# Patient Record
Sex: FEMALE | Race: WHITE | NOT HISPANIC OR LATINO | Employment: FULL TIME | URBAN - METROPOLITAN AREA
[De-identification: names, ages, dates, MRNs, and addresses within clinical notes are randomized per-mention and may not be internally consistent; named-entity substitution may affect disease eponyms.]

---

## 2023-10-19 PROBLEM — G43.909 MIGRAINE: Status: ACTIVE | Noted: 2023-10-19

## 2023-10-19 RX ORDER — EPINEPHRINE 0.3 MG/.3ML
0.3 INJECTION SUBCUTANEOUS EVERY 5 MIN PRN
Status: CANCELLED | OUTPATIENT
Start: 2023-10-30

## 2023-10-19 RX ORDER — ALBUTEROL SULFATE 0.83 MG/ML
3 SOLUTION RESPIRATORY (INHALATION) AS NEEDED
Status: CANCELLED | OUTPATIENT
Start: 2023-10-30

## 2023-10-19 RX ORDER — DIPHENHYDRAMINE HYDROCHLORIDE 50 MG/ML
50 INJECTION INTRAMUSCULAR; INTRAVENOUS AS NEEDED
Status: CANCELLED | OUTPATIENT
Start: 2023-10-30

## 2023-10-19 RX ORDER — FAMOTIDINE 10 MG/ML
20 INJECTION INTRAVENOUS ONCE AS NEEDED
Status: CANCELLED | OUTPATIENT
Start: 2023-10-30

## 2023-10-29 PROBLEM — M48.061 LUMBAR SPINAL STENOSIS: Status: ACTIVE | Noted: 2023-10-29

## 2023-10-29 PROBLEM — E03.9 HYPOTHYROIDISM: Status: ACTIVE | Noted: 2023-10-29

## 2023-10-29 PROBLEM — M79.7 FIBROMYALGIA: Status: ACTIVE | Noted: 2023-10-29

## 2023-10-29 PROBLEM — E88.810 DYSMETABOLIC SYNDROME X: Status: ACTIVE | Noted: 2023-10-29

## 2023-10-29 PROBLEM — M06.9 RHEUMATOID ARTHRITIS (MULTI): Status: ACTIVE | Noted: 2023-10-29

## 2023-10-29 RX ORDER — HYDROGEN PEROXIDE 3 %
SOLUTION, NON-ORAL MISCELLANEOUS
COMMUNITY
Start: 2012-02-03

## 2023-10-29 RX ORDER — TIZANIDINE 4 MG/1
TABLET ORAL
COMMUNITY
Start: 2013-01-03

## 2023-10-29 RX ORDER — POLYETHYLENE GLYCOL 3350 17 G/17G
POWDER, FOR SOLUTION ORAL
COMMUNITY
Start: 2016-04-23

## 2023-10-29 RX ORDER — LEVOCETIRIZINE DIHYDROCHLORIDE 5 MG/1
TABLET, FILM COATED ORAL
COMMUNITY
Start: 2012-11-14

## 2023-10-29 RX ORDER — ERYTHROMYCIN 500 MG/1
TABLET, DELAYED RELEASE ORAL
COMMUNITY
Start: 2013-10-13

## 2023-10-29 RX ORDER — ESOMEPRAZOLE MAGNESIUM 40 MG/1
CAPSULE, DELAYED RELEASE ORAL
COMMUNITY
Start: 2016-04-23 | End: 2024-01-30 | Stop reason: SDUPTHER

## 2023-10-29 RX ORDER — IBUPROFEN 600 MG/1
TABLET ORAL
COMMUNITY
Start: 2012-11-07

## 2023-10-29 RX ORDER — BUPROPION HYDROCHLORIDE 150 MG/1
TABLET ORAL
COMMUNITY
Start: 2012-04-24

## 2023-10-29 RX ORDER — PROMETHAZINE HYDROCHLORIDE 25 MG/1
TABLET ORAL
COMMUNITY
Start: 2012-07-30

## 2023-10-29 RX ORDER — FROVATRIPTAN SUCCINATE 2.5 MG/1
TABLET, FILM COATED ORAL
COMMUNITY
Start: 2012-07-30

## 2023-10-29 RX ORDER — BUTALBITAL, ACETAMINOPHEN, CAFFEINE AND CODEINE PHOSPHATE 300; 50; 40; 30 MG/1; MG/1; MG/1; MG/1
CAPSULE ORAL
COMMUNITY
Start: 2017-04-13

## 2023-10-29 RX ORDER — NARATRIPTAN 2.5 MG/1
TABLET ORAL
COMMUNITY
Start: 2012-12-12

## 2023-10-29 RX ORDER — HYDROCODONE BITARTRATE AND ACETAMINOPHEN 5; 325 MG/1; MG/1
TABLET ORAL
COMMUNITY
Start: 2017-03-24

## 2023-10-29 RX ORDER — HYDROXYZINE PAMOATE 50 MG/1
CAPSULE ORAL
COMMUNITY
Start: 2012-10-17

## 2023-10-29 RX ORDER — ALPRAZOLAM 0.5 MG/1
TABLET ORAL
COMMUNITY
Start: 2012-07-30

## 2023-10-29 RX ORDER — ZOLMITRIPTAN 5 MG/1
SPRAY NASAL
COMMUNITY
Start: 2016-09-30

## 2023-10-29 RX ORDER — GABAPENTIN 100 MG/1
CAPSULE ORAL
COMMUNITY
Start: 2017-08-30

## 2023-10-29 RX ORDER — ORPHENADRINE CITRATE 100 MG/1
1 TABLET, EXTENDED RELEASE ORAL 2 TIMES DAILY
COMMUNITY
Start: 2017-08-10

## 2023-10-29 RX ORDER — LEVOTHYROXINE SODIUM 125 UG/1
1 TABLET ORAL DAILY
COMMUNITY
Start: 2012-07-10

## 2023-10-29 RX ORDER — THYROID 90 MG/1
TABLET ORAL
COMMUNITY
Start: 2017-06-12

## 2023-10-29 RX ORDER — OMEPRAZOLE 40 MG/1
CAPSULE, DELAYED RELEASE ORAL
COMMUNITY
Start: 2017-07-12

## 2023-10-29 ASSESSMENT — PATIENT HEALTH QUESTIONNAIRE - PHQ9
5. POOR APPETITE OR OVEREATING: SEVERAL DAYS
2. FEELING DOWN, DEPRESSED OR HOPELESS: NOT AT ALL
3. TROUBLE FALLING OR STAYING ASLEEP OR SLEEPING TOO MUCH: NOT AT ALL
2. FEELING DOWN, DEPRESSED, IRRITABLE, OR HOPELESS: NOT AT ALL
4. FEELING TIRED OR HAVING LITTLE ENERGY: SEVERAL DAYS
3. TROUBLE FALLING OR STAYING ASLEEP OR SLEEPING TOO MUCH: 0
2. FEELING DOWN, DEPRESSED, IRRITABLE, OR HOPELESS: 0
4. FEELING TIRED OR HAVING LITTLE ENERGY: 1
10. IF YOU CHECKED OFF ANY PROBLEMS, HOW DIFFICULT HAVE THESE PROBLEMS MADE IT FOR YOU TO DO YOUR WORK, TAKE CARE OF THINGS AT HOME, OR GET ALONG WITH OTHER PEOPLE: NOT DIFFICULT AT ALL
8. MOVING OR SPEAKING SO SLOWLY THAT OTHER PEOPLE COULD HAVE NOTICED. OR THE OPPOSITE, BEING SO FIGETY OR RESTLESS THAT YOU HAVE BEEN MOVING AROUND A LOT MORE THAN USUAL: NOT AT ALL
7. TROUBLE CONCENTRATING ON THINGS, SUCH AS READING THE NEWSPAPER OR WATCHING TELEVISION: 1
1. LITTLE INTEREST OR PLEASURE IN DOING THINGS: 0
6. FEELING BAD ABOUT YOURSELF - OR THAT YOU ARE A FAILURE OR HAVE LET YOURSELF OR YOUR FAMILY DOWN: 0
6. FEELING BAD ABOUT YOURSELF - OR THAT YOU ARE A FAILURE OR HAVE LET YOURSELF OR YOUR FAMILY DOWN: NOT AT ALL
1. LITTLE INTEREST OR PLEASURE IN DOING THINGS: NOT AT ALL
4. FEELING TIRED OR HAVING LITTLE ENERGY: SEVERAL DAYS
5. POOR APPETITE OR OVEREATING: 1
6. FEELING BAD ABOUT YOURSELF - OR THAT YOU ARE A FAILURE OR HAVE LET YOURSELF OR YOUR FAMILY DOWN: NOT AT ALL
SUM OF ALL RESPONSES TO PHQ QUESTIONS 1-9: 3
10. IF YOU CHECKED OFF ANY PROBLEMS, HOW DIFFICULT HAVE THESE PROBLEMS MADE IT FOR YOU TO DO YOUR WORK, TAKE CARE OF THINGS AT HOME, OR GET ALONG WITH OTHER PEOPLE: NOT DIFFICULT AT ALL
7. TROUBLE CONCENTRATING ON THINGS, SUCH AS READING THE NEWSPAPER OR WATCHING TELEVISION: SEVERAL DAYS
3. TROUBLE FALLING OR STAYING ASLEEP OR SLEEPING TOO MUCH: NOT AT ALL
8. MOVING OR SPEAKING SO SLOWLY THAT OTHER PEOPLE COULD HAVE NOTICED. OR THE OPPOSITE, BEING SO FIGETY OR RESTLESS THAT YOU HAVE BEEN MOVING AROUND A LOT MORE THAN USUAL: NOT AT ALL
1. LITTLE INTEREST OR PLEASURE IN DOING THINGS: NOT AT ALL
7. TROUBLE CONCENTRATING ON THINGS, SUCH AS READING THE NEWSPAPER OR WATCHING TELEVISION: SEVERAL DAYS
9. THOUGHTS THAT YOU WOULD BE BETTER OFF DEAD, OR OF HURTING YOURSELF: NOT AT ALL
SUM OF ALL RESPONSES TO PHQ QUESTIONS 1-9: 3
5. POOR APPETITE OR OVEREATING: SEVERAL DAYS
9. THOUGHTS THAT YOU WOULD BE BETTER OFF DEAD, OR OF HURTING YOURSELF: NOT AT ALL
8. MOVING OR SPEAKING SO SLOWLY THAT OTHER PEOPLE COULD HAVE NOTICED. OR THE OPPOSITE, BEING SO FIGETY OR RESTLESS THAT YOU HAVE BEEN MOVING AROUND A LOT MORE THAN USUAL: 0
9. THOUGHTS THAT YOU WOULD BE BETTER OFF DEAD, OR OF HURTING YOURSELF: 0

## 2023-10-30 ENCOUNTER — INFUSION (OUTPATIENT)
Dept: INFUSION THERAPY | Facility: CLINIC | Age: 63
End: 2023-10-30
Payer: MEDICARE

## 2023-10-30 VITALS
TEMPERATURE: 97 F | RESPIRATION RATE: 16 BRPM | OXYGEN SATURATION: 98 % | HEART RATE: 77 BPM | DIASTOLIC BLOOD PRESSURE: 84 MMHG | SYSTOLIC BLOOD PRESSURE: 161 MMHG | WEIGHT: 146.61 LBS

## 2023-10-30 DIAGNOSIS — G43.901 MIGRAINE WITH STATUS MIGRAINOSUS, NOT INTRACTABLE, UNSPECIFIED MIGRAINE TYPE: ICD-10-CM

## 2023-10-30 DIAGNOSIS — G43.809 OTHER MIGRAINE WITHOUT STATUS MIGRAINOSUS, NOT INTRACTABLE: Primary | ICD-10-CM

## 2023-10-30 PROCEDURE — 96365 THER/PROPH/DIAG IV INF INIT: CPT | Performed by: NURSE PRACTITIONER

## 2023-10-30 RX ORDER — EPINEPHRINE 0.3 MG/.3ML
0.3 INJECTION SUBCUTANEOUS EVERY 5 MIN PRN
Status: CANCELLED | OUTPATIENT
Start: 2024-01-28

## 2023-10-30 RX ORDER — DICYCLOMINE HYDROCHLORIDE 10 MG/1
10 CAPSULE ORAL 4 TIMES DAILY
COMMUNITY

## 2023-10-30 RX ORDER — PREDNISONE 10 MG/1
TABLET ORAL
COMMUNITY

## 2023-10-30 RX ORDER — HYDROXYCHLOROQUINE SULFATE 200 MG/1
TABLET, FILM COATED ORAL
COMMUNITY

## 2023-10-30 RX ORDER — ALBUTEROL SULFATE 0.83 MG/ML
3 SOLUTION RESPIRATORY (INHALATION) AS NEEDED
Status: CANCELLED | OUTPATIENT
Start: 2024-01-28

## 2023-10-30 RX ORDER — DIPHENHYDRAMINE HYDROCHLORIDE 50 MG/ML
50 INJECTION INTRAMUSCULAR; INTRAVENOUS AS NEEDED
Status: CANCELLED | OUTPATIENT
Start: 2024-01-28

## 2023-10-30 RX ORDER — FAMOTIDINE 10 MG/ML
20 INJECTION INTRAVENOUS ONCE AS NEEDED
Status: CANCELLED | OUTPATIENT
Start: 2024-01-28

## 2023-10-30 ASSESSMENT — ENCOUNTER SYMPTOMS
DEPRESSION: 0
NERVOUS/ANXIOUS: 0
HEADACHES: 1
SPEECH DIFFICULTY: 0
TROUBLE SWALLOWING: 1
WOUND: 0
CHILLS: 0
WHEEZING: 0
PALPITATIONS: 0
CONFUSION: 0
EXTREMITY WEAKNESS: 0
ABDOMINAL PAIN: 1
NUMBNESS: 0
COUGH: 0
FATIGUE: 1
APPETITE CHANGE: 0
DIZZINESS: 0
LIGHT-HEADEDNESS: 0
MUSCULOSKELETAL NEGATIVE: 1
SLEEP DISTURBANCE: 1
SORE THROAT: 0
CHEST TIGHTNESS: 0
EYE PROBLEMS: 0
UNEXPECTED WEIGHT CHANGE: 1
SHORTNESS OF BREATH: 0
FEVER: 1
LEG SWELLING: 0

## 2023-10-30 NOTE — PATIENT INSTRUCTIONS
Today you received: VYEPTI 100MG INFUSION    YOUR NEXT APPOINTMENT WILL BE IN 3 MONTHS    For:   1. Other migraine without status migrainosus, not intractable    2. Migraine with status migrainosus, not intractable, unspecified migraine type          Please read the  Medication Guide that was given to you and reviewed during todays visit.     (Tell all doctors including dentists that you are taking this medication)     Go to the emergency room or call 911 if:  -You have signs of allergic reaction:   o         Rash, hives, itching.   o         Swollen, blistered, peeling skin.   o         Swelling of face, lips, mouth, tongue or throat.   o         Tightness of chest, trouble breathing, swallowing or talking      Call your doctor:     - If IV / injection site gets red, warm, swollen, itchy or leaks fluid or pus.     (Leave dressing on your IV site for at least 2 hours and keep area clean and dry  - If you get sick or have symptoms of infection or are not feeling well for any reason.    (Wash your hands often, stay away from people who are sick)  - If you have side effects from your medication that do not go away or are bothersome.     (Refer to the teaching your nurse gave you for side effects to call your doctor about)       Common side effects may include:  stuffy nose, headache, feeling tired, muscle aches, upset stomach  - Before receiving any vaccines     Call the [facility] at [phone]  if:  - You get sick, are on antibiotics, have had a recent vaccine, have surgery or dental work and your doctor wants your visit rescheduled.  - You need to cancel and reschedule your visit for any reason. Call at least 2 days before your visit if you need to cancel.   - Your insurance changes before your next visit.    (We will need to get approval from your new insurance. This can take up to two weeks.)     [facility] is opened Monday thru Friday. We are closed on weekends and  holidays.     Voice mail will take your call if the center is closed. If you leave a message please allow 24 hours for a call back during weekdays. If you leave a message on a weekend/holiday, we will call you back the next business day.

## 2023-10-30 NOTE — PROGRESS NOTES
[facility]   infusion Clinic Note   Date: 2023   Name: Adelia Garcia  : 1960   MRN: [id number]         Reason for Visit: OP Infusion (PT HERE FOR FIRST DOSE VYEPTI 100MG INFUSION.)       Visit Type: INFUSION     Ordered By: LALITO KRAUS CNP   Accompanied by:Self      Diagnosis: Other migraine without status migrainosus, not intractable    Migraine with status migrainosus, not intractable, unspecified migraine type      Allergies:   Allergies as of 10/30/2023 - Reviewed 10/30/2023   Allergen Reaction Noted    Doxycycline Constipation and Headache 10/29/2023    Penicillins Hives, Itching, and Swelling 10/29/2023        Current Medications has a current medication list which includes the following prescription(s): alprazolam, bupropion xl, butalbital-acetaminop-caf-cod, dicyclomine, eptinezumab, erythromycin, esomeprazole, esomeprazole, frovatriptan, gabapentin, hydrocodone-acetaminophen, hydroxychloroquine, hydroxyzine pamoate, ibuprofen, levocetirizine, levothyroxine, naratriptan, omeprazole, orphenadrine, polyethylene glycol, prednisone, promethazine, thyroid (pork), tizanidine, and zolmitriptan.          Vitals:  Vitals:    10/30/23 1409 10/30/23 1515   BP: 159/85 161/84   Pulse: 85 77   Resp: 16 16   Temp: 36.3 °C (97.4 °F) 36.1 °C (97 °F)   SpO2: 98% 98%   Weight: 66.5 kg (146 lb 9.7 oz)           Infusion Pre-procedure Checklist:   Allergies reviewed: yes   Medications reviewed: yes     Previous reaction to current treatment: No TODAY IS FIRST DOSE VYEPTI      Assess patient for the concerns below. Document provider notification as appropriate.  - Active or recent infection with/without current antibiotic use: no  - Recent or planned invasive dental work: no  - Recent or planned surgeries: no  - Recently received or plans to receive vaccinations: yes POSSIBLE COVID VACCINE  - Has treatment related toxicities: no  - Is pregnant:  no    - Does the patient meet  "criteria to treat? Yes    Provider notified? No      Pain: 8    Is the pain different from normal: no   Is the pain tolerable: yes   Is your Doctor aware: yes       Labs:       Fall Risk Screening: Weaver Fall Risk  History of Falling, Immediate or Within 3 Months: No  Secondary Diagnosis: No  Ambulatory Aid: Walks without aid/bedrest/nurse assist  Intravenous Therapy/Heparin Lock: Yes  Gait/Transferring: Normal/bedrest/immobile  Mental Status: Oriented to own ability  Weaver Fall Risk Score: 20       Review of Systems   Constitutional:  Positive for fatigue, fever and unexpected weight change. Negative for appetite change and chills.        PT STATES BACK IN MARCH SHE WAS EVALUATED FOR GI ISSUES AFTER LOSING APPROX 35 LBS. PT STATES SHE \"RUNS FEVERS REGULARLY.\"    HENT:   Positive for trouble swallowing. Negative for hearing loss, mouth sores, sore throat and tinnitus.         HX OF THIS. HAS BEEN EVALUATED   Eyes:  Negative for eye problems.   Respiratory:  Negative for chest tightness, cough, shortness of breath and wheezing.    Cardiovascular:  Negative for chest pain, leg swelling and palpitations.   Gastrointestinal:  Positive for abdominal pain.        HX OF THIS AND HAS MEDICAITON   Genitourinary: Negative.     Musculoskeletal: Negative.    Skin:  Negative for rash and wound.   Neurological:  Positive for headaches. Negative for dizziness, extremity weakness, light-headedness, numbness and speech difficulty.        PT HAS HX OF MIGRAINES AND HAS THEM ALMOST DAILY. MIGRAINES HAVE GOTTEN WORSE AND WILL WAKE PT UP IN THE MIDDLE OF THE NIGHT   Psychiatric/Behavioral:  Positive for sleep disturbance. Negative for confusion and depression. The patient is not nervous/anxious.         MIGRAINES WILL WAKE PT UP AT NIGHT         Infusion Readiness:   Assessment Concerns Related to Infusion: No  Provider notified: no      Document Below Only If Indicated:   New Patient Education:  PT PROVIDED WITH WRITTEN ([facility] PT " EDUCATION SHEET) AND VERBAL EDUCATION REGARDING MEDICATION GIVEN. VERIFIED MEDICATION NAME WITH PATIENT AND DISCUSSED REASON FOR USE. BRIEFLY DISCUSSED HOW MEDICATION WORKS AND EDUCATED ON GOAL OF TREATMENT, FREQUENCY OF TREATMENT, ADVERSE RXN'S AND COMMON SIDE EFFECTS TO MONITOR FOR. INSTRUCTED PT TO ASSURE THAT ALL PROVIDERS INCLUDING DENTISTS ARE AWARE OF MEDICATION RECEIVED. DISCUSSED FLOW OF VISIT AND ORIENTED TO INFUSION CENTER. PT VERBALIZES UNDERSTANDING. CALL LIGHT PROVIDED AND PT AWARE TO ALERT STAFF OF ANY CONCERNS DURING TREATMENT.         Drug Specific Questions:  Any recent cardiovascular or cerebrovascular ischemic events such as a stroke or heart attack? PT DENIES  (if yes hold and notify prescribing provider. Avoid use)       Weight Based Drug Calculations:  MEDICATION IS FLAT DOSE          Initiated By: Albertina Potter RN   Time: 3:51 PM     We administered eptinezumab (Vyepti) 100 mg in sodium chloride 0.9% 101 mL IV.

## 2024-01-30 ENCOUNTER — INFUSION (OUTPATIENT)
Dept: INFUSION THERAPY | Facility: CLINIC | Age: 64
End: 2024-01-30
Payer: MEDICARE

## 2024-01-30 VITALS
RESPIRATION RATE: 16 BRPM | DIASTOLIC BLOOD PRESSURE: 87 MMHG | SYSTOLIC BLOOD PRESSURE: 149 MMHG | WEIGHT: 154.1 LBS | HEART RATE: 89 BPM | TEMPERATURE: 97.7 F | OXYGEN SATURATION: 98 %

## 2024-01-30 DIAGNOSIS — G43.901 MIGRAINE WITH STATUS MIGRAINOSUS, NOT INTRACTABLE, UNSPECIFIED MIGRAINE TYPE: ICD-10-CM

## 2024-01-30 PROCEDURE — 96365 THER/PROPH/DIAG IV INF INIT: CPT | Performed by: NURSE PRACTITIONER

## 2024-01-30 RX ORDER — EPINEPHRINE 0.3 MG/.3ML
0.3 INJECTION SUBCUTANEOUS EVERY 5 MIN PRN
Status: CANCELLED | OUTPATIENT
Start: 2024-04-27

## 2024-01-30 RX ORDER — ETODOLAC 400 MG/1
400 TABLET, FILM COATED ORAL 2 TIMES DAILY
COMMUNITY

## 2024-01-30 RX ORDER — DIPHENHYDRAMINE HYDROCHLORIDE 50 MG/ML
50 INJECTION INTRAMUSCULAR; INTRAVENOUS AS NEEDED
Status: CANCELLED | OUTPATIENT
Start: 2024-04-27

## 2024-01-30 RX ORDER — PREGABALIN 25 MG/1
25 CAPSULE ORAL 2 TIMES DAILY
COMMUNITY

## 2024-01-30 RX ORDER — ALBUTEROL SULFATE 0.83 MG/ML
3 SOLUTION RESPIRATORY (INHALATION) AS NEEDED
Status: CANCELLED | OUTPATIENT
Start: 2024-04-27

## 2024-01-30 RX ORDER — FAMOTIDINE 10 MG/ML
20 INJECTION INTRAVENOUS ONCE AS NEEDED
Status: CANCELLED | OUTPATIENT
Start: 2024-04-27

## 2024-01-30 ASSESSMENT — ENCOUNTER SYMPTOMS
HEMATURIA: 0
ROS GI COMMENTS: MANAGED
WOUND: 0
LIGHT-HEADEDNESS: 0
TROUBLE SWALLOWING: 0
VOICE CHANGE: 0
FREQUENCY: 0
DIZZINESS: 0
ARTHRALGIAS: 1
MYALGIAS: 1
COUGH: 0
VOMITING: 0
EXTREMITY WEAKNESS: 0
SORE THROAT: 0
APPETITE CHANGE: 0
CONSTIPATION: 0
DYSURIA: 0
NUMBNESS: 0
SHORTNESS OF BREATH: 0
PALPITATIONS: 0
FATIGUE: 0
FEVER: 0
CHILLS: 0
DIARRHEA: 0
BLOOD IN STOOL: 0
NAUSEA: 0
UNEXPECTED WEIGHT CHANGE: 0
ABDOMINAL PAIN: 0
LEG SWELLING: 0
EYE PROBLEMS: 0
HEADACHES: 1
WHEEZING: 0

## 2024-01-30 NOTE — PROGRESS NOTES
[facility]   infusion Clinic Note   Date: 2024   Name: Adelia Garcia  : 1960   MRN: [id number]         Reason for Visit: Follow-up and OP Infusion (PT IS HERE FOR VYEPTI 100 MG EVERY 90 DAYS.)         Visit Type: INFUSION      Ordered By: CAMILLE KRAUS CNP      Accompanied by:Self      Diagnosis: Migraine with status migrainosus, not intractable, unspecified migraine type       Allergies:   Allergies as of 2024 - Reviewed 2024   Allergen Reaction Noted    Doxycycline Constipation and Headache 10/29/2023    Penicillins Hives, Itching, and Swelling 10/29/2023         Current Medications has a current medication list which includes the following prescription(s): bupropion xl, butalbital-acetaminop-caf-cod, dicyclomine, eptinezumab, esomeprazole, etodolac, hydroxychloroquine, levothyroxine, polyethylene glycol, prednisone, pregabalin, promethazine, tizanidine, zolmitriptan, alprazolam, erythromycin, frovatriptan, gabapentin, hydrocodone-acetaminophen, hydroxyzine pamoate, ibuprofen, levocetirizine, naratriptan, omeprazole, orphenadrine, and thyroid (pork).       Vitals:  Vitals:    24 1350 24 1458   BP: 145/82 149/87   Pulse: 96 89   Resp: 16 16   Temp: 36.2 °C (97.2 °F) 36.5 °C (97.7 °F)   TempSrc:  Temporal   SpO2: 99% 98%   Weight: 69.9 kg (154 lb 1.6 oz)              Infusion Pre-procedure Checklist:   - Allergies reviewed: yes   - Medications reviewed: yes       - Previous reaction to current treatment: no      Assess patient for the concerns below. Document provider notification as appropriate.  - Active or recent infection with/without current antibiotic use: no  - Recent or planned invasive dental work: no  - Recent or planned surgeries: no  - Recently received or plans to receive vaccinations: no  - Has treatment related toxicities: no  - Is pregnant:  no      Pain: 7   - Is the pain different from normal: no   - Is the pain tolerable: yes   -  Is your Doctor aware:  yes      Labs: N/A         Fall Risk Screening: Weaver Fall Risk  History of Falling, Immediate or Within 3 Months: No  Secondary Diagnosis: Yes  Ambulatory Aid: Walks without aid/bedrest/nurse assist  Intravenous Therapy/Heparin Lock: Yes  Gait/Transferring: Normal/bedrest/immobile  Mental Status: Oriented to own ability  Weaver Fall Risk Score: 35       Review Of Systems:  Review of Systems   Constitutional:  Negative for appetite change, chills, fatigue, fever and unexpected weight change.   HENT:   Negative for hearing loss, mouth sores, sore throat, tinnitus, trouble swallowing and voice change.    Eyes:  Negative for eye problems.   Respiratory:  Negative for cough, shortness of breath and wheezing.    Cardiovascular:  Negative for chest pain, leg swelling and palpitations.   Gastrointestinal:  Negative for abdominal pain, blood in stool, constipation, diarrhea, nausea and vomiting.        MANAGED   Genitourinary:  Negative for dysuria, frequency and hematuria.    Musculoskeletal:  Positive for arthralgias and myalgias.        MANAGED   Skin:  Negative for itching, rash and wound.   Neurological:  Positive for headaches. Negative for dizziness, extremity weakness, light-headedness and numbness.        MANAGED         Infusion Readiness:   - Assessment Concerns Related to Infusion: No  - Provider notified: n/a      Document Below Only If Indicated:   New Patient Education:    N/A (returning patient for continuation of therapy. Ongoing education provided as needed.)        Treatment Conditions & Drug Specific Questions:    Eptinezumab  (VYEPTI)    (Unless otherwise specified on patient specific therapy plan):     DRUG SPECIFIC QUESTIONS:  Any recent cardiovascular or cerebrovascular ischemic events such as a stroke or heart attack?  No  (if yes hold and notify prescribing provider. Avoid use)     REMINDER:  Recommended Vitals/Observation:  Vitals: Monitor vitals at start and end of infusion,  end of monitoring period (1st infusion), and as needed.  Observation: Patient may leave 15 minutes post-infusion for FIRST infusion. Patient may leave immediately upon completion for all subsequent infusions        Weight Based Drug Calculations:    WEIGHT BASED DRUGS: NOT APPLICABLE / FLAT DOSE          Initiated By: Suri Altman RN   Time: 3:21 PM     We administered eptinezumab (Vyepti) 100 mg in sodium chloride 0.9% 101 mL IV.

## 2024-01-30 NOTE — PATIENT INSTRUCTIONS
Today :We administered eptinezumab (Vyepti) 100 mg in sodium chloride 0.9% 101 mL IV.     For:   1. Migraine with status migrainosus, not intractable, unspecified migraine type         Your next appointment is due in:  3 MO        Please read the  Medication Guide that was given to you and reviewed during todays visit.     (Tell all doctors including dentists that you are taking this medication)     Go to the emergency room or call 911 if:  -You have signs of allergic reaction:   -Rash, hives, itching.   -Swollen, blistered, peeling skin.   -Swelling of face, lips, mouth, tongue or throat.   -Tightness of chest, trouble breathing, swallowing or talking     Call your doctor:  - If IV / injection site gets red, warm, swollen, itchy or leaks fluid or pus.     (Leave dressing on your IV site for at least 2 hours and keep area clean and dry  - If you get sick or have symptoms of infection or are not feeling well for any reason.    (Wash your hands often, stay away from people who are sick)  - If you have side effects from your medication that do not go away or are bothersome.     (Refer to the teaching your nurse gave you for side effects to call your doctor about)    - Common side effects may include:  stuffy nose, headache, feeling tired, muscle aches, upset stomach  - Before receiving any vaccines     - Call the [facility] at   If:  - You get sick, are on antibiotics, have had a recent vaccine, have surgery or dental work and your doctor wants your visit rescheduled.  - You need to cancel and reschedule your visit for any reason. Call at least 2 days before your visit if you need to cancel.   - Your insurance changes before your next visit.    (We will need to get approval from your new insurance. This can take up to two weeks.)     The [facility] is opened Monday thru Friday. We are closed on weekends and holidays.   Voice mail will take your call if the center is closed. If you  leave a message please allow 24 hours for a call back during weekdays. If you leave a message on a weekend/holiday, we will call you back the next business day.

## 2024-03-18 DIAGNOSIS — G43.901 MIGRAINE WITH STATUS MIGRAINOSUS, NOT INTRACTABLE, UNSPECIFIED MIGRAINE TYPE: Primary | ICD-10-CM

## 2024-03-20 RX ORDER — DIPHENHYDRAMINE HYDROCHLORIDE 50 MG/ML
50 INJECTION INTRAMUSCULAR; INTRAVENOUS AS NEEDED
Status: CANCELLED | OUTPATIENT
Start: 2024-04-30

## 2024-03-20 RX ORDER — EPINEPHRINE 0.3 MG/.3ML
0.3 INJECTION SUBCUTANEOUS EVERY 5 MIN PRN
Status: CANCELLED | OUTPATIENT
Start: 2024-04-30

## 2024-03-20 RX ORDER — ALBUTEROL SULFATE 0.83 MG/ML
3 SOLUTION RESPIRATORY (INHALATION) AS NEEDED
Status: CANCELLED | OUTPATIENT
Start: 2024-04-30

## 2024-03-20 RX ORDER — FAMOTIDINE 10 MG/ML
20 INJECTION INTRAVENOUS ONCE AS NEEDED
Status: CANCELLED | OUTPATIENT
Start: 2024-04-30

## 2024-04-30 ENCOUNTER — INFUSION (OUTPATIENT)
Dept: INFUSION THERAPY | Facility: CLINIC | Age: 64
End: 2024-04-30
Payer: MEDICARE

## 2024-04-30 VITALS
WEIGHT: 159.61 LBS | DIASTOLIC BLOOD PRESSURE: 85 MMHG | RESPIRATION RATE: 16 BRPM | SYSTOLIC BLOOD PRESSURE: 145 MMHG | OXYGEN SATURATION: 97 % | HEART RATE: 81 BPM | TEMPERATURE: 98.6 F

## 2024-04-30 DIAGNOSIS — G43.901 MIGRAINE WITH STATUS MIGRAINOSUS, NOT INTRACTABLE, UNSPECIFIED MIGRAINE TYPE: ICD-10-CM

## 2024-04-30 PROCEDURE — 96365 THER/PROPH/DIAG IV INF INIT: CPT | Performed by: NURSE PRACTITIONER

## 2024-04-30 RX ORDER — EPINEPHRINE 0.3 MG/.3ML
0.3 INJECTION SUBCUTANEOUS EVERY 5 MIN PRN
OUTPATIENT
Start: 2024-07-29

## 2024-04-30 RX ORDER — FAMOTIDINE 10 MG/ML
20 INJECTION INTRAVENOUS ONCE AS NEEDED
OUTPATIENT
Start: 2024-07-29

## 2024-04-30 RX ORDER — ALBUTEROL SULFATE 0.83 MG/ML
3 SOLUTION RESPIRATORY (INHALATION) AS NEEDED
OUTPATIENT
Start: 2024-07-29

## 2024-04-30 RX ORDER — DIPHENHYDRAMINE HYDROCHLORIDE 50 MG/ML
50 INJECTION INTRAMUSCULAR; INTRAVENOUS AS NEEDED
OUTPATIENT
Start: 2024-07-29

## 2024-04-30 ASSESSMENT — ENCOUNTER SYMPTOMS
HEMATURIA: 0
LIGHT-HEADEDNESS: 0
VOMITING: 0
APPETITE CHANGE: 0
SORE THROAT: 0
EXTREMITY WEAKNESS: 0
MYALGIAS: 0
DIARRHEA: 0
ABDOMINAL PAIN: 0
VOICE CHANGE: 0
SHORTNESS OF BREATH: 0
DEPRESSION: 0
FREQUENCY: 0
ARTHRALGIAS: 0
UNEXPECTED WEIGHT CHANGE: 0
BLOOD IN STOOL: 0
TROUBLE SWALLOWING: 0
WHEEZING: 0
CHILLS: 0
LEG SWELLING: 0
FEVER: 0
HEADACHES: 1
NAUSEA: 0
PALPITATIONS: 0
DYSURIA: 0
COUGH: 0
EYE PROBLEMS: 1
NUMBNESS: 0
NERVOUS/ANXIOUS: 0
WOUND: 0
DIZZINESS: 0
CONSTIPATION: 0
FATIGUE: 0

## 2024-04-30 NOTE — PATIENT INSTRUCTIONS
Today :We administered eptinezumab (Vyepti) 300 mg in sodium chloride 0.9% 103 mL IV.     For:   1. Migraine with status migrainosus, not intractable, unspecified migraine type         Your next appointment is due in:  3 MO        Please read the  Medication Guide that was given to you and reviewed during todays visit.     (Tell all doctors including dentists that you are taking this medication)     Go to the emergency room or call 911 if:  -You have signs of allergic reaction:   -Rash, hives, itching.   -Swollen, blistered, peeling skin.   -Swelling of face, lips, mouth, tongue or throat.   -Tightness of chest, trouble breathing, swallowing or talking     Call your doctor:  - If IV / injection site gets red, warm, swollen, itchy or leaks fluid or pus.     (Leave dressing on your IV site for at least 2 hours and keep area clean and dry  - If you get sick or have symptoms of infection or are not feeling well for any reason.    (Wash your hands often, stay away from people who are sick)  - If you have side effects from your medication that do not go away or are bothersome.     (Refer to the teaching your nurse gave you for side effects to call your doctor about)    - Common side effects may include:  stuffy nose, headache, feeling tired, muscle aches, upset stomach  - Before receiving any vaccines     - Call the [facility] at   If:  - You get sick, are on antibiotics, have had a recent vaccine, have surgery or dental work and your doctor wants your visit rescheduled.  - You need to cancel and reschedule your visit for any reason. Call at least 2 days before your visit if you need to cancel.   - Your insurance changes before your next visit.    (We will need to get approval from your new insurance. This can take up to two weeks.)     [facility] is opened Monday thru Friday. We are closed on weekends and holidays.   Voice mail will take your call if the center is closed. If you  leave a message please allow 24 hours for a call back during weekdays. If you leave a message on a weekend/holiday, we will call you back the next business day.

## 2024-04-30 NOTE — PROGRESS NOTES
[facility]   Infusion Clinic Note   Date: 2024   Name: Adelia Garcia  : 1960   MRN: [id number]         Reason for Visit: Follow-up and OP Infusion (PT HERE FOR VYEPTI 300 MG INFUSION EVERY 90 DAYS)         Visit Type: INFUSION       Ordered By:NAYANA KIRKLAND      Accompanied by:Self      Diagnosis: Migraine with status migrainosus, not intractable, unspecified migraine type       Allergies:   Allergies as of 2024 - Reviewed 2024   Allergen Reaction Noted    Doxycycline Constipation and Headache 10/29/2023    Imitrex [sumatriptan] Other 2024    Penicillins Hives, Itching, and Swelling 10/29/2023         Current Medications has a current medication list which includes the following prescription(s): bupropion xl, butalbital-acetaminop-caf-cod, dicyclomine, eptinezumab, esomeprazole, etodolac, hydroxychloroquine, levothyroxine, naratriptan, polyethylene glycol, prednisone, pregabalin, promethazine, tizanidine, zolmitriptan, alprazolam, erythromycin, frovatriptan, gabapentin, hydrocodone-acetaminophen, hydroxyzine pamoate, ibuprofen, levocetirizine, omeprazole, orphenadrine, and thyroid (pork).       Vitals:   Vitals:    24 1315 24 1417   BP: 148/79 145/85   Pulse: 86 81   Resp: 16 16   Temp: 36.5 °C (97.7 °F) 37 °C (98.6 °F)   TempSrc: Temporal Temporal   SpO2: 96% 97%   Weight: 72.4 kg (159 lb 9.8 oz)              Infusion Pre-procedure Checklist:   - Allergies reviewed: yes   - Medications reviewed: yes       - Previous reaction to current treatment: no      Assess patient for the concerns below. Document provider notification as appropriate.  - Active or recent infection with/without current antibiotic use: no  - Recent or planned invasive dental work: yes, MAY 9TH PLANNED TOOTH PULLED   - Recent or planned surgeries: no  - Recently received or plans to receive vaccinations: no  - Has treatment related toxicities: no  - Is pregnant:  n/a       Pain: 5, MIGRAINE    - Is the pain different from normal: no   - Is the pain tolerable: yes   - Is your Doctor aware:  yes      Labs: N/A         Fall Risk Screening: Weaver Fall Risk  History of Falling, Immediate or Within 3 Months: No  Secondary Diagnosis: No  Ambulatory Aid: Walks without aid/bedrest/nurse assist  Intravenous Therapy/Heparin Lock: Yes  Gait/Transferring: Normal/bedrest/immobile  Mental Status: Oriented to own ability  Weaver Fall Risk Score: 20       Review Of Systems:  Review of Systems   Constitutional:  Negative for appetite change, chills, fatigue, fever and unexpected weight change.   HENT:   Negative for hearing loss, mouth sores, sore throat, tinnitus, trouble swallowing and voice change.    Eyes:  Positive for eye problems.        GLASSES, DRY EYES, ROSACEA IN EYES    Respiratory:  Negative for cough, shortness of breath and wheezing.    Cardiovascular:  Negative for chest pain, leg swelling and palpitations.   Gastrointestinal:  Negative for abdominal pain, blood in stool, constipation, diarrhea, nausea and vomiting.   Genitourinary:  Negative for dysuria, frequency and hematuria.    Musculoskeletal:  Negative for arthralgias and myalgias.   Skin:  Negative for itching, rash and wound.   Neurological:  Positive for headaches. Negative for dizziness, extremity weakness, light-headedness and numbness.        MIGRAINES MANAGED WITH MEDICATION    Psychiatric/Behavioral:  Negative for depression. The patient is not nervous/anxious.          Infusion Readiness:   - Assessment Concerns Related to Infusion: No  - Provider notified: n/a      Document Below Only If Indicated:   New Patient Education:    N/A (returning patient for continuation of therapy. Ongoing education provided as needed.)        Treatment Conditions & Drug Specific Questions:    Eptinezumab  (VYEPTI)    (Unless otherwise specified on patient specific therapy plan):     DRUG SPECIFIC QUESTIONS:  Any recent cardiovascular or  cerebrovascular ischemic events such as a stroke or heart attack?  No  (if yes hold and notify prescribing provider. Avoid use)     REMINDER:  Recommended Vitals/Observation:  Vitals: Monitor vitals at start and end of infusion, end of monitoring period (1st infusion), and as needed.  Observation: Patient may leave 15 minutes post-infusion for FIRST infusion. Patient may leave immediately upon completion for all subsequent infusions        Weight Based Drug Calculations:    WEIGHT BASED DRUGS: NOT APPLICABLE / FLAT DOSE          Initiated By: Suri Altman RN   Time: 2:39 PM     We administered eptinezumab (Vyepti) 300 mg in sodium chloride 0.9% 103 mL IV.

## 2024-07-30 ENCOUNTER — APPOINTMENT (OUTPATIENT)
Dept: INFUSION THERAPY | Facility: CLINIC | Age: 64
End: 2024-07-30
Payer: MEDICARE

## 2024-07-30 VITALS
RESPIRATION RATE: 16 BRPM | DIASTOLIC BLOOD PRESSURE: 79 MMHG | HEART RATE: 69 BPM | WEIGHT: 158.62 LBS | TEMPERATURE: 97.3 F | OXYGEN SATURATION: 96 % | SYSTOLIC BLOOD PRESSURE: 125 MMHG

## 2024-07-30 DIAGNOSIS — G43.901 MIGRAINE WITH STATUS MIGRAINOSUS, NOT INTRACTABLE, UNSPECIFIED MIGRAINE TYPE: ICD-10-CM

## 2024-07-30 PROCEDURE — 96365 THER/PROPH/DIAG IV INF INIT: CPT | Performed by: NURSE PRACTITIONER

## 2024-07-30 RX ORDER — DIPHENHYDRAMINE HYDROCHLORIDE 50 MG/ML
50 INJECTION INTRAMUSCULAR; INTRAVENOUS AS NEEDED
OUTPATIENT
Start: 2024-10-27

## 2024-07-30 RX ORDER — ALBUTEROL SULFATE 0.83 MG/ML
3 SOLUTION RESPIRATORY (INHALATION) AS NEEDED
OUTPATIENT
Start: 2024-10-27

## 2024-07-30 RX ORDER — FAMOTIDINE 10 MG/ML
20 INJECTION INTRAVENOUS ONCE AS NEEDED
OUTPATIENT
Start: 2024-10-27

## 2024-07-30 RX ORDER — EPINEPHRINE 0.3 MG/.3ML
0.3 INJECTION SUBCUTANEOUS EVERY 5 MIN PRN
OUTPATIENT
Start: 2024-10-27

## 2024-07-30 ASSESSMENT — ENCOUNTER SYMPTOMS
WHEEZING: 0
VOMITING: 0
DYSURIA: 0
EYE PROBLEMS: 0
UNEXPECTED WEIGHT CHANGE: 0
CONSTIPATION: 0
PALPITATIONS: 0
WOUND: 0
HEADACHES: 0
DIARRHEA: 0
NERVOUS/ANXIOUS: 0
FEVER: 0
ARTHRALGIAS: 1
ABDOMINAL PAIN: 0
SORE THROAT: 0
NUMBNESS: 0
MYALGIAS: 1
BLOOD IN STOOL: 0
DEPRESSION: 0
FATIGUE: 0
DIZZINESS: 0
NAUSEA: 0
LIGHT-HEADEDNESS: 0
LEG SWELLING: 0
HEMATURIA: 0
COUGH: 0
CHILLS: 0
APPETITE CHANGE: 0
VOICE CHANGE: 0
SHORTNESS OF BREATH: 0
TROUBLE SWALLOWING: 0
FREQUENCY: 0
EXTREMITY WEAKNESS: 0

## 2024-07-30 NOTE — PATIENT INSTRUCTIONS
Today :We administered eptinezumab (Vyepti) 300 mg in sodium chloride 0.9% 100 mL IV.     For:   1. Migraine with status migrainosus, not intractable, unspecified migraine type         Your next appointment is due in:  90 DAYS        Please read the  Medication Guide that was given to you and reviewed during todays visit.     (Tell all doctors including dentists that you are taking this medication)     Go to the emergency room or call 911 if:  -You have signs of allergic reaction:   -Rash, hives, itching.   -Swollen, blistered, peeling skin.   -Swelling of face, lips, mouth, tongue or throat.   -Tightness of chest, trouble breathing, swallowing or talking     Call your doctor:  - If IV / injection site gets red, warm, swollen, itchy or leaks fluid or pus.     (Leave dressing on your IV site for at least 2 hours and keep area clean and dry  - If you get sick or have symptoms of infection or are not feeling well for any reason.    (Wash your hands often, stay away from people who are sick)  - If you have side effects from your medication that do not go away or are bothersome.     (Refer to the teaching your nurse gave you for side effects to call your doctor about)    - Common side effects may include:  stuffy nose, headache, feeling tired, muscle aches, upset stomach  - Before receiving any vaccines     - Call the [facility] at   If:  - You get sick, are on antibiotics, have had a recent vaccine, have surgery or dental work and your doctor wants your visit rescheduled.  - You need to cancel and reschedule your visit for any reason. Call at least 2 days before your visit if you need to cancel.   - Your insurance changes before your next visit.    (We will need to get approval from your new insurance. This can take up to two weeks.)     [facility] is opened Monday thru Friday. We are closed on weekends and holidays.   Voice mail will take your call if the center is closed. If you  leave a message please allow 24 hours for a call back during weekdays. If you leave a message on a weekend/holiday, we will call you back the next business day.

## 2024-07-30 NOTE — PROGRESS NOTES
[facility]   Infusion Clinic Note   Date: 2024   Name: Adelia Garcia  : 1960   MRN: [id number]         Reason for Visit: OP Infusion and Follow-up (PT HERE FOR VYEPTI 300 MG INFUSION/NEXT APPT: 90 DAYS)         Today: We administered eptinezumab (Vyepti) 300 mg in sodium chloride 0.9% 100 mL IV.       Visit Type: INFUSION       Ordered By: DR KRAUS      Accompanied by:Self      Diagnosis: Migraine with status migrainosus, not intractable, unspecified migraine type       Allergies:   Allergies as of 2024 - Reviewed 2024   Allergen Reaction Noted    Doxycycline Constipation and Headache 10/29/2023    Imitrex [sumatriptan] Other 2024    Penicillins Hives, Itching, and Swelling 10/29/2023         Current Medications has a current medication list which includes the following prescription(s): alprazolam, bupropion xl, butalbital-acetaminop-caf-cod, dicyclomine, eptinezumab, erythromycin, esomeprazole, etodolac, frovatriptan, gabapentin, hydrocodone-acetaminophen, hydroxychloroquine, hydroxyzine pamoate, ibuprofen, levocetirizine, levothyroxine, naratriptan, omeprazole, orphenadrine, polyethylene glycol, prednisone, pregabalin, promethazine, thyroid (pork), tizanidine, and zolmitriptan.       Vitals:   Vitals:    24 1119 24 1218   BP: 130/69 125/79   Pulse: 73 69   Resp: 18 16   Temp: 36.6 °C (97.8 °F) 36.3 °C (97.3 °F)   SpO2: 98% 96%   Weight: 72 kg (158 lb 9.9 oz)              Infusion Pre-procedure Checklist:   - Allergies reviewed: yes   - Medications reviewed: yes       - Previous reaction to current treatment: no      Assess patient for the concerns below. Document provider notification as appropriate.  - Active or recent infection with/without current antibiotic use: no  - Recent or planned invasive dental work: no  - Recent or planned surgeries: no  - Recently received or plans to receive vaccinations: no  - Has treatment related  toxicities: no  - Is pregnant:  n/a      Pain: 0   - Is the pain different from normal: n/a   - Is the pain tolerable: n/a   - Is your Doctor aware:  n/a      Labs: N/A         Fall Risk Screening: Weaver Fall Risk  History of Falling, Immediate or Within 3 Months: No  Secondary Diagnosis: No  Ambulatory Aid: Walks without aid/bedrest/nurse assist  Intravenous Therapy/Heparin Lock: Yes  Gait/Transferring: Normal/bedrest/immobile  Mental Status: Oriented to own ability  Weaver Fall Risk Score: 20       Review Of Systems:  Review of Systems   Constitutional:  Negative for appetite change, chills, fatigue, fever and unexpected weight change.   HENT:   Negative for hearing loss, mouth sores, sore throat, tinnitus, trouble swallowing and voice change.    Eyes:  Negative for eye problems.   Respiratory:  Negative for cough, shortness of breath and wheezing.    Cardiovascular:  Negative for chest pain, leg swelling and palpitations.   Gastrointestinal:  Negative for abdominal pain, blood in stool, constipation, diarrhea, nausea and vomiting.   Genitourinary:  Negative for dysuria, frequency and hematuria.    Musculoskeletal:  Positive for arthralgias and myalgias.        HX OF RHEUMATOID ARTHRITIS   Skin:  Negative for itching, rash and wound.   Neurological:  Negative for dizziness, extremity weakness, headaches, light-headedness and numbness.   Psychiatric/Behavioral:  Negative for depression. The patient is not nervous/anxious.          Infusion Readiness:   - Assessment Concerns Related to Infusion: No  - Provider notified: n/a      Document Below Only If Indicated:   New Patient Education:    N/A (returning patient for continuation of therapy. Ongoing education provided as needed.)        Treatment Conditions & Drug Specific Questions:    Eptinezumab  (VYEPTI)    (Unless otherwise specified on patient specific therapy plan):     DRUG SPECIFIC QUESTIONS:  Any recent cardiovascular or cerebrovascular ischemic events such  as a stroke or heart attack?  No  (if yes hold and notify prescribing provider. Avoid use)     REMINDER:  Recommended Vitals/Observation:  Vitals: Monitor vitals at start and end of infusion, end of monitoring period (1st infusion), and as needed.  Observation: Patient may leave 15 minutes post-infusion for FIRST infusion. Patient may leave immediately upon completion for all subsequent infusions        Weight Based Drug Calculations:    WEIGHT BASED DRUGS: NOT APPLICABLE / FLAT DOSE          Initiated By: Kaylee Guerrero RN

## 2024-10-30 ENCOUNTER — APPOINTMENT (OUTPATIENT)
Dept: INFUSION THERAPY | Facility: CLINIC | Age: 64
End: 2024-10-30
Payer: MEDICARE

## 2024-10-30 VITALS
RESPIRATION RATE: 16 BRPM | SYSTOLIC BLOOD PRESSURE: 132 MMHG | WEIGHT: 157.52 LBS | OXYGEN SATURATION: 96 % | DIASTOLIC BLOOD PRESSURE: 82 MMHG | HEART RATE: 69 BPM | TEMPERATURE: 97.5 F

## 2024-10-30 DIAGNOSIS — G43.901 MIGRAINE WITH STATUS MIGRAINOSUS, NOT INTRACTABLE, UNSPECIFIED MIGRAINE TYPE: ICD-10-CM

## 2024-10-30 PROCEDURE — 96365 THER/PROPH/DIAG IV INF INIT: CPT | Performed by: NURSE PRACTITIONER

## 2024-10-30 RX ORDER — ALBUTEROL SULFATE 0.83 MG/ML
3 SOLUTION RESPIRATORY (INHALATION) AS NEEDED
OUTPATIENT
Start: 2025-01-26

## 2024-10-30 RX ORDER — FAMOTIDINE 10 MG/ML
20 INJECTION INTRAVENOUS ONCE AS NEEDED
OUTPATIENT
Start: 2025-01-26

## 2024-10-30 RX ORDER — DIPHENHYDRAMINE HYDROCHLORIDE 50 MG/ML
50 INJECTION INTRAMUSCULAR; INTRAVENOUS AS NEEDED
OUTPATIENT
Start: 2025-01-26

## 2024-10-30 RX ORDER — EPINEPHRINE 0.3 MG/.3ML
0.3 INJECTION SUBCUTANEOUS EVERY 5 MIN PRN
OUTPATIENT
Start: 2025-01-26

## 2024-10-30 ASSESSMENT — ENCOUNTER SYMPTOMS
ARTHRALGIAS: 1
PALPITATIONS: 0
WHEEZING: 0
SORE THROAT: 0
FEVER: 0
APPETITE CHANGE: 0
ABDOMINAL PAIN: 0
DIARRHEA: 0
SHORTNESS OF BREATH: 0
BLOOD IN STOOL: 0
UNEXPECTED WEIGHT CHANGE: 0
TROUBLE SWALLOWING: 0
EYE PROBLEMS: 0
VOMITING: 0
NAUSEA: 0
CONSTIPATION: 0
MYALGIAS: 1
EXTREMITY WEAKNESS: 0
NERVOUS/ANXIOUS: 0
CHILLS: 0
NUMBNESS: 0
WOUND: 0
DEPRESSION: 0
LIGHT-HEADEDNESS: 0
DIZZINESS: 0
HEADACHES: 0
FATIGUE: 0
FREQUENCY: 0
LEG SWELLING: 0
DYSURIA: 0
HEMATURIA: 0
VOICE CHANGE: 0
COUGH: 0

## 2025-01-30 ENCOUNTER — APPOINTMENT (OUTPATIENT)
Dept: INFUSION THERAPY | Facility: CLINIC | Age: 65
End: 2025-01-30
Payer: MEDICARE

## 2025-01-30 VITALS
SYSTOLIC BLOOD PRESSURE: 127 MMHG | HEART RATE: 76 BPM | OXYGEN SATURATION: 98 % | RESPIRATION RATE: 16 BRPM | WEIGHT: 161.16 LBS | TEMPERATURE: 97 F | DIASTOLIC BLOOD PRESSURE: 75 MMHG

## 2025-01-30 DIAGNOSIS — G43.901 MIGRAINE WITH STATUS MIGRAINOSUS, NOT INTRACTABLE, UNSPECIFIED MIGRAINE TYPE: ICD-10-CM

## 2025-01-30 PROCEDURE — 96365 THER/PROPH/DIAG IV INF INIT: CPT | Performed by: NURSE PRACTITIONER

## 2025-01-30 RX ORDER — EPINEPHRINE 0.3 MG/.3ML
0.3 INJECTION SUBCUTANEOUS EVERY 5 MIN PRN
OUTPATIENT
Start: 2025-04-28

## 2025-01-30 RX ORDER — DIPHENHYDRAMINE HYDROCHLORIDE 50 MG/ML
50 INJECTION INTRAMUSCULAR; INTRAVENOUS AS NEEDED
OUTPATIENT
Start: 2025-04-28

## 2025-01-30 RX ORDER — ALBUTEROL SULFATE 0.83 MG/ML
3 SOLUTION RESPIRATORY (INHALATION) AS NEEDED
OUTPATIENT
Start: 2025-04-28

## 2025-01-30 RX ORDER — FAMOTIDINE 10 MG/ML
20 INJECTION INTRAVENOUS ONCE AS NEEDED
OUTPATIENT
Start: 2025-04-28

## 2025-01-30 ASSESSMENT — ENCOUNTER SYMPTOMS
HEMATURIA: 0
FREQUENCY: 0
HEADACHES: 1
VOMITING: 0
DEPRESSION: 0
DIZZINESS: 0
PALPITATIONS: 0
CONSTIPATION: 1
LIGHT-HEADEDNESS: 0
FEVER: 0
DIARRHEA: 1
NAUSEA: 0
CHILLS: 0
EYE PROBLEMS: 0
VOICE CHANGE: 0
TROUBLE SWALLOWING: 0
UNEXPECTED WEIGHT CHANGE: 0
ABDOMINAL PAIN: 0
SORE THROAT: 0
APPETITE CHANGE: 0
WOUND: 0
ARTHRALGIAS: 1
CONFUSION: 0
COUGH: 0
NERVOUS/ANXIOUS: 0
DYSURIA: 0
MYALGIAS: 1
FATIGUE: 0
BLOOD IN STOOL: 0
LEG SWELLING: 0
NUMBNESS: 1
SHORTNESS OF BREATH: 0
WHEEZING: 0
EXTREMITY WEAKNESS: 0

## 2025-01-30 NOTE — PATIENT INSTRUCTIONS
Today :We administered eptinezumab (Vyepti) 300 mg in sodium chloride 0.9% 103 mL IV.     For:   1. Migraine with status migrainosus, not intractable, unspecified migraine type         Your next appointment is due in:  90 DAYS         Please read the  Medication Guide that was given to you and reviewed during todays visit.     (Tell all doctors including dentists that you are taking this medication)     Go to the emergency room or call 911 if:  -You have signs of allergic reaction:   -Rash, hives, itching.   -Swollen, blistered, peeling skin.   -Swelling of face, lips, mouth, tongue or throat.   -Tightness of chest, trouble breathing, swallowing or talking     Call your doctor:  - If IV / injection site gets red, warm, swollen, itchy or leaks fluid or pus.     (Leave dressing on your IV site for at least 2 hours and keep area clean and dry  - If you get sick or have symptoms of infection or are not feeling well for any reason.    (Wash your hands often, stay away from people who are sick)  - If you have side effects from your medication that do not go away or are bothersome.     (Refer to the teaching your nurse gave you for side effects to call your doctor about)    - Common side effects may include:  stuffy nose, headache, feeling tired, muscle aches, upset stomach  - Before receiving any vaccines     - [facility] at   If:  - You get sick, are on antibiotics, have had a recent vaccine, have surgery or dental work and your doctor wants your visit rescheduled.  - You need to cancel and reschedule your visit for any reason. Call at least 2 days before your visit if you need to cancel.   - Your insurance changes before your next visit.    (We will need to get approval from your new insurance. This can take up to two weeks.)     [facility] is opened Monday thru Friday. We are closed on weekends and holidays.   Voice mail will take your call if the center is closed. If you  leave a message please allow 24 hours for a call back during weekdays. If you leave a message on a weekend/holiday, we will call you back the next business day.    A pharmacist is available Monday - Friday from 8:30AM to 3:30PM to help answer any questions you may have about your prescriptions(s). Please call pharmacy at:    [facility]: [phone]  [facility]: [phone]  [facility]: [phone]

## 2025-01-30 NOTE — PROGRESS NOTES
[facility]   Infusion Clinic Note   Date: 2025   Name: Adelia Garcia  : 1960   MRN: [id number]          Reason for Visit: OP Infusion (PT HERE FOR Q 90 DAY VYEPTI 300 MG INFUSION)         Today: We administered eptinezumab (Vyepti) 300 mg in sodium chloride 0.9% 103 mL IV.       Visit Type: INFUSION       Ordered By: LALITO KRAUS       Accompanied by: Self       Diagnosis: Migraine with status migrainosus, not intractable, unspecified migraine type        Allergies:   Allergies as of 2025 - Reviewed 2025   Allergen Reaction Noted    Doxycycline Constipation and Headache 10/29/2023    Imitrex [sumatriptan] Other 2024    Penicillins Hives, Itching, and Swelling 10/29/2023          Current Medications: has a current medication list which includes the following prescription(s): bupropion xl, butalbital-acetaminop-caf-cod, dicyclomine, eptinezumab, esomeprazole, hydrocodone-acetaminophen, hydroxychloroquine, levothyroxine, naratriptan, polyethylene glycol, pregabalin, promethazine, tizanidine, zolmitriptan, alprazolam, erythromycin, etodolac, frovatriptan, gabapentin, hydroxyzine pamoate, ibuprofen, levocetirizine, omeprazole, orphenadrine, prednisone, and thyroid (pork).       Vitals:   Vitals:    25 1105 25 1202 25 1244   BP: 119/70 139/82 127/75   Pulse: 84 68 76   Resp: 16 16 16   Temp: 36.1 °C (97 °F) 36 °C (96.8 °F) 36.1 °C (97 °F)   SpO2: 96% 95% 98%   Weight: 73.1 kg (161 lb 2.5 oz)               Infusion Pre-procedure Checklist:   - Allergies & Medications reviewed: yes       - Previous reaction to current treatment: no      Assess patient for the concerns below. Document provider notification as appropriate.  - Active or recent infection with/without current antibiotic use: no  - Recent or planned invasive dental work: no  - Recent or planned surgeries: no  - Recently received or plans to receive vaccinations: no  - Has  treatment related toxicities: no  - Any chance you may be pregnant:  n/a      Pain: 6 FIBRO MYALGIA    - Is the pain different from normal: n/a   - Is your Doctor aware:  n/a       Labs: N/A          Fall Risk Screening: Weaver Fall Risk  History of Falling, Immediate or Within 3 Months: No  Secondary Diagnosis: No  Ambulatory Aid: Walks without aid/bedrest/nurse assist  Intravenous Therapy/Heparin Lock: Yes  Gait/Transferring: Normal/bedrest/immobile  Mental Status: Oriented to own ability  Weaver Fall Risk Score: 20       Review Of Systems:  Review of Systems   Constitutional:  Negative for appetite change, chills, fatigue, fever and unexpected weight change.   HENT:   Negative for hearing loss, mouth sores, sore throat, tinnitus, trouble swallowing and voice change.    Eyes:  Negative for eye problems.   Respiratory:  Negative for cough, shortness of breath and wheezing.    Cardiovascular:  Negative for chest pain, leg swelling and palpitations.   Gastrointestinal:  Positive for constipation and diarrhea. Negative for abdominal pain, blood in stool, nausea and vomiting.        Fluctuations    Genitourinary:  Negative for dysuria, frequency and hematuria.    Musculoskeletal:  Positive for arthralgias and myalgias.        HX OF FIBROMYALGIA   Skin:  Negative for itching, rash and wound.   Neurological:  Positive for headaches and numbness. Negative for dizziness, extremity weakness and light-headedness.        HX OF MIGRAINES. ADMITS TO IMPROVEMENT TO ABOUT 3-4 MIGRAINES   HX OF CARPAL TUNNEL JOSE FRANCISCO   Psychiatric/Behavioral:  Negative for confusion and depression. The patient is not nervous/anxious.          ROS completed? Yes      Infusion Readiness:  - Assessment Concerns Related to Infusion: No  - Provider notified: n/a      Document Below Only If Indicated:   New Patient Education:    N/A (returning patient for continuation of therapy. Ongoing education provided as needed.)        Treatment Conditions & Drug  Specific Questions:    Eptinezumab  (VYEPTI)    (Unless otherwise specified on patient specific therapy plan):     DRUG SPECIFIC QUESTIONS:  Any recent cardiovascular or cerebrovascular ischemic events such as a stroke or heart attack?  No  (if yes hold and notify prescribing provider. Avoid use)     REMINDER:  Recommended Vitals/Observation:  Vitals: Monitor vitals at start and end of infusion, end of monitoring period (1st infusion), and as needed.  Observation: Patient may leave 15 minutes post-infusion for FIRST infusion. Patient may leave immediately upon completion for all subsequent infusions        Weight Based Drug Calculations:    WEIGHT BASED DRUGS: NOT APPLICABLE / FLAT DOSE          Initiated By: Deloris Oro RN

## 2025-04-01 ENCOUNTER — TRANSCRIBE ORDERS (OUTPATIENT)
Dept: INFUSION THERAPY | Facility: CLINIC | Age: 65
End: 2025-04-01
Payer: MEDICARE

## 2025-04-01 DIAGNOSIS — G43.901 MIGRAINE WITH STATUS MIGRAINOSUS, NOT INTRACTABLE, UNSPECIFIED MIGRAINE TYPE: Primary | ICD-10-CM

## 2025-04-01 RX ORDER — DIPHENHYDRAMINE HYDROCHLORIDE 50 MG/ML
50 INJECTION, SOLUTION INTRAMUSCULAR; INTRAVENOUS AS NEEDED
OUTPATIENT
Start: 2025-04-30

## 2025-04-01 RX ORDER — FAMOTIDINE 10 MG/ML
20 INJECTION, SOLUTION INTRAVENOUS ONCE AS NEEDED
OUTPATIENT
Start: 2025-04-30

## 2025-04-01 RX ORDER — EPINEPHRINE 0.3 MG/.3ML
0.3 INJECTION SUBCUTANEOUS EVERY 5 MIN PRN
OUTPATIENT
Start: 2025-04-30

## 2025-04-01 RX ORDER — ALBUTEROL SULFATE 0.83 MG/ML
3 SOLUTION RESPIRATORY (INHALATION) AS NEEDED
OUTPATIENT
Start: 2025-04-30

## 2025-04-30 ENCOUNTER — APPOINTMENT (OUTPATIENT)
Dept: INFUSION THERAPY | Facility: CLINIC | Age: 65
End: 2025-04-30
Payer: MEDICARE

## 2025-04-30 VITALS
SYSTOLIC BLOOD PRESSURE: 142 MMHG | HEART RATE: 65 BPM | OXYGEN SATURATION: 97 % | TEMPERATURE: 97.8 F | DIASTOLIC BLOOD PRESSURE: 76 MMHG | RESPIRATION RATE: 16 BRPM

## 2025-04-30 DIAGNOSIS — G43.901 MIGRAINE WITH STATUS MIGRAINOSUS, NOT INTRACTABLE, UNSPECIFIED MIGRAINE TYPE: ICD-10-CM

## 2025-04-30 PROCEDURE — 96365 THER/PROPH/DIAG IV INF INIT: CPT | Performed by: NURSE PRACTITIONER

## 2025-04-30 RX ORDER — DIPHENHYDRAMINE HYDROCHLORIDE 50 MG/ML
50 INJECTION, SOLUTION INTRAMUSCULAR; INTRAVENOUS AS NEEDED
OUTPATIENT
Start: 2025-07-29

## 2025-04-30 RX ORDER — FAMOTIDINE 10 MG/ML
20 INJECTION, SOLUTION INTRAVENOUS ONCE AS NEEDED
OUTPATIENT
Start: 2025-07-29

## 2025-04-30 RX ORDER — ALBUTEROL SULFATE 0.83 MG/ML
3 SOLUTION RESPIRATORY (INHALATION) AS NEEDED
OUTPATIENT
Start: 2025-07-29

## 2025-04-30 RX ORDER — EPINEPHRINE 0.3 MG/.3ML
0.3 INJECTION SUBCUTANEOUS EVERY 5 MIN PRN
OUTPATIENT
Start: 2025-07-29

## 2025-04-30 ASSESSMENT — ENCOUNTER SYMPTOMS
VOMITING: 0
NAUSEA: 0
NERVOUS/ANXIOUS: 0
DIARRHEA: 0
FREQUENCY: 0
UNEXPECTED WEIGHT CHANGE: 0
MYALGIAS: 0
SORE THROAT: 0
TROUBLE SWALLOWING: 0
SHORTNESS OF BREATH: 0
DIZZINESS: 0
HEADACHES: 0
LEG SWELLING: 0
ARTHRALGIAS: 0
NUMBNESS: 0
CONSTIPATION: 0
VOICE CHANGE: 0
WOUND: 0
BRUISES/BLEEDS EASILY: 0
BLOOD IN STOOL: 0
PALPITATIONS: 0
FATIGUE: 0
COUGH: 0
APPETITE CHANGE: 0
EXTREMITY WEAKNESS: 0
EYE PROBLEMS: 0
DEPRESSION: 0
FEVER: 0
DYSURIA: 0
ABDOMINAL PAIN: 0
HEMATURIA: 0
CHILLS: 0
LIGHT-HEADEDNESS: 0
WHEEZING: 0

## 2025-04-30 NOTE — PROGRESS NOTES
[facility]   Infusion Clinic Note   Date: 2025   Name: Adelia Garcia  : 1960   MRN: [id number]         Reason for Visit: Follow-up and OP Infusion (PT HERE FOR VYEPTI 300MG/NEXT APT: 3 MONTHS )         Today: We administered eptinezumab (Vyepti) 300 mg in sodium chloride 0.9% 103 mL IV.       Ordered By: Priya Parish APRN*       For a Diagnosis of: Migraine with status migrainosus, not intractable, unspecified migraine type       At today's visit patient accompanied by: Self      Today's Vitals:   Vitals:    25 1130 25 1200   BP: 138/81 142/76   Pulse: 75 65   Resp: 16 16   Temp: 36.6 °C (97.8 °F) 36.6 °C (97.8 °F)   SpO2: 98% 97%             Pre - Treatment Checklist:      - Previous reaction to current treatment: no      (Assess patient for the concerns below. Document provider notification as appropriate).  - Active or recent infection with/without current antibiotic use: no  - Recent or planned invasive dental work: no  - Recent or planned surgeries: no  - Recently received or plans to receive vaccinations: no  - Has treatment related toxicities: no  - Any chance may be pregnant:  n/a      Pain: 0   - Is the pain different from normal: n/a   - Is prescribing Doctor aware:  n/a      Labs: Reviewed       Fall Risk Screening: Meg Fall Risk  History of Falling, Immediate or Within 3 Months: No  Secondary Diagnosis: No  Ambulatory Aid: Walks without aid/bedrest/nurse assist  Intravenous Therapy/Heparin Lock: No  Gait/Transferring: Normal/bedrest/immobile  Mental Status: Oriented to own ability  Weaver Fall Risk Score: 0       Review Of Systems:  Review of Systems   Constitutional:  Negative for appetite change, chills, fatigue, fever and unexpected weight change.   HENT:   Negative for hearing loss, mouth sores, sore throat, tinnitus, trouble swallowing and voice change.    Eyes:  Negative for eye problems.   Respiratory:  Negative for cough, shortness  of breath and wheezing.    Cardiovascular:  Negative for chest pain, leg swelling and palpitations.   Gastrointestinal:  Negative for abdominal pain, blood in stool, constipation, diarrhea, nausea and vomiting.   Genitourinary:  Negative for dysuria, frequency and hematuria.    Musculoskeletal:  Negative for arthralgias and myalgias.   Skin:  Negative for itching, rash and wound.   Neurological:  Negative for dizziness, extremity weakness, headaches, light-headedness and numbness.   Hematological:  Does not bruise/bleed easily.   Psychiatric/Behavioral:  Negative for depression. The patient is not nervous/anxious.          Infusion Readiness:  - Assessment Concerns Related to Infusion: No  - Provider notified: n/a      New Patient Education:    N/A (returning patient for continuation of therapy. Ongoing education provided as needed.)        Treatment Conditions & Drug Specific Questions:    Eptinezumab  (VYEPTI)    (Unless otherwise specified on patient specific therapy plan):     DRUG SPECIFIC QUESTIONS:  Any recent cardiovascular or cerebrovascular ischemic events such as a stroke or heart attack?  No  (if yes hold and notify prescribing provider. Avoid use)     REMINDER:  Recommended Vitals/Observation:  Vitals: Monitor vitals at start and end of infusion, end of monitoring period (1st infusion), and as needed.  Observation: Patient may leave 15 minutes post-infusion for FIRST infusion. Patient may leave immediately upon completion for all subsequent infusions        Weight Based Drug Calculations:    WEIGHT BASED DRUGS: NOT APPLICABLE / FLAT DOSE       Post Treatment: Patient tolerated treatment without issue and was discharged in no apparent distress.      Note Authored / Patient Cared for By: Will Davis RN

## 2025-04-30 NOTE — PATIENT INSTRUCTIONS
Today :We administered eptinezumab (Vyepti) 300 mg in sodium chloride 0.9% 103 mL IV.     For:   1. Migraine with status migrainosus, not intractable, unspecified migraine type         Your next appointment is due in:  3 MONTHS         Please read the  Medication Guide that was given to you and reviewed during todays visit.     (Tell all doctors including dentists that you are taking this medication)     Go to the emergency room or call 911 if:  -You have signs of allergic reaction:   -Rash, hives, itching.   -Swollen, blistered, peeling skin.   -Swelling of face, lips, mouth, tongue or throat.   -Tightness of chest, trouble breathing, swallowing or talking     Call your doctor:  - If IV / injection site gets red, warm, swollen, itchy or leaks fluid or pus.     (Leave dressing on your IV site for at least 2 hours and keep area clean and dry  - If you get sick or have symptoms of infection or are not feeling well for any reason.    (Wash your hands often, stay away from people who are sick)  - If you have side effects from your medication that do not go away or are bothersome.     (Refer to the teaching your nurse gave you for side effects to call your doctor about)    - Common side effects may include:  stuffy nose, headache, feeling tired, muscle aches, upset stomach  - Before receiving any vaccines     - [facility] at   If:  - You get sick, are on antibiotics, have had a recent vaccine, have surgery or dental work and your doctor wants your visit rescheduled.  - You need to cancel and reschedule your visit for any reason. Call at least 2 days before your visit if you need to cancel.   - Your insurance changes before your next visit.    (We will need to get approval from your new insurance. This can take up to two weeks.)     [facility] is opened Monday thru Friday. We are closed on weekends and holidays.   Voice mail will take your call if the center is closed. If you  leave a message please allow 24 hours for a call back during weekdays. If you leave a message on a weekend/holiday, we will call you back the next business day.    A pharmacist is available Monday - Friday from 8:30AM to 3:30PM to help answer any questions you may have about your prescriptions(s). Please call pharmacy at:    [facility]: [phone]  [facility]: [phone]  [facility]: [phone]

## 2025-07-29 ENCOUNTER — APPOINTMENT (OUTPATIENT)
Dept: INFUSION THERAPY | Facility: CLINIC | Age: 65
End: 2025-07-29
Payer: MEDICARE

## 2025-07-31 ENCOUNTER — APPOINTMENT (OUTPATIENT)
Dept: INFUSION THERAPY | Facility: CLINIC | Age: 65
End: 2025-07-31
Payer: MEDICARE

## 2025-07-31 VITALS
DIASTOLIC BLOOD PRESSURE: 74 MMHG | SYSTOLIC BLOOD PRESSURE: 117 MMHG | HEART RATE: 74 BPM | TEMPERATURE: 97 F | RESPIRATION RATE: 16 BRPM | OXYGEN SATURATION: 96 % | WEIGHT: 169.2 LBS

## 2025-07-31 DIAGNOSIS — G43.901 MIGRAINE WITH STATUS MIGRAINOSUS, NOT INTRACTABLE, UNSPECIFIED MIGRAINE TYPE: Primary | ICD-10-CM

## 2025-07-31 PROCEDURE — 96365 THER/PROPH/DIAG IV INF INIT: CPT | Performed by: NURSE PRACTITIONER

## 2025-07-31 RX ORDER — ALBUTEROL SULFATE 0.83 MG/ML
3 SOLUTION RESPIRATORY (INHALATION) AS NEEDED
OUTPATIENT
Start: 2025-10-27

## 2025-07-31 RX ORDER — FAMOTIDINE 10 MG/ML
20 INJECTION, SOLUTION INTRAVENOUS ONCE AS NEEDED
OUTPATIENT
Start: 2025-10-27

## 2025-07-31 RX ORDER — EPINEPHRINE 0.3 MG/.3ML
0.3 INJECTION SUBCUTANEOUS EVERY 5 MIN PRN
OUTPATIENT
Start: 2025-10-27

## 2025-07-31 RX ORDER — DIPHENHYDRAMINE HYDROCHLORIDE 50 MG/ML
50 INJECTION, SOLUTION INTRAMUSCULAR; INTRAVENOUS AS NEEDED
OUTPATIENT
Start: 2025-10-27

## 2025-07-31 ASSESSMENT — ENCOUNTER SYMPTOMS
WOUND: 0
ABDOMINAL DISTENTION: 0
EXTREMITY WEAKNESS: 0
FATIGUE: 0
FEVER: 0
DIARRHEA: 1
NUMBNESS: 0
FREQUENCY: 0
WHEEZING: 0
LEG SWELLING: 0
CONSTIPATION: 1
DYSURIA: 0
ABDOMINAL PAIN: 0
HEADACHES: 1
EYE PROBLEMS: 0
BRUISES/BLEEDS EASILY: 0
DIZZINESS: 0
VOICE CHANGE: 0
NAUSEA: 1
MYALGIAS: 0
SORE THROAT: 0
CHILLS: 0
APPETITE CHANGE: 0
HEMATURIA: 0
LIGHT-HEADEDNESS: 0
VOMITING: 1
SHORTNESS OF BREATH: 0
PALPITATIONS: 0
COUGH: 0
TROUBLE SWALLOWING: 0
UNEXPECTED WEIGHT CHANGE: 0
ARTHRALGIAS: 0

## 2025-07-31 NOTE — PROGRESS NOTES
[facility]   Infusion Clinic Note   Date: 2025   Name: Adelia Garcia  : 1960   MRN: [id number]         Reason for Visit: OP Infusion (VYEPTI 300 MG IV EVERY 3 MONTHS ) and Follow-up         Today: We administered eptinezumab (Vyepti) 300 mg in sodium chloride 0.9% 103 mL IV.       Referring Provider: yesika lynn CNP    Plan Provider:  Yesika Condon CNP      For a Diagnosis of: Migraine with status migrainosus, not intractable, unspecified migraine type       At today's visit patient accompanied by: Self      Today's Vitals:   Vitals:    25 1100 25 1158   BP: 122/79 117/74   Pulse: 80 74   Resp: 16 16   Temp: 36 °C (96.8 °F) 36.1 °C (97 °F)   TempSrc: Temporal Temporal   SpO2: 98% 96%   Weight: 76.8 kg (169 lb 3.3 oz)              Pre - Treatment Checklist:      - Previous reaction to current treatment: no      (Assess patient for the concerns below. Document provider notification as appropriate).  - Active or recent infection with/without current antibiotic use: no  - Recent or planned invasive dental work: YES SCHEDULED FOR DENTAL IMPLANT IN 2025  - Recent or planned surgeries: YES SCHEDULED FOR SURGERY AUG 18 2025- [facility] JOINT OF TOE   - Recently received or plans to receive vaccinations: no  - Has treatment related toxicities: no  - Any chance may be pregnant:  n/a      Pain: 0   - Is the pain different from normal: n/a   - Is prescribing Doctor aware:  n/a      Labs: N/A      Fall Risk Screening: Weaver Fall Risk  History of Falling, Immediate or Within 3 Months: No  Secondary Diagnosis: No  Ambulatory Aid: Walks without aid/bedrest/nurse assist  Intravenous Therapy/Heparin Lock: Yes  Gait/Transferring: Normal/bedrest/immobile  Mental Status: Oriented to own ability  Weaver Fall Risk Score: 20            Review Of Systems:  Review of Systems   Constitutional:  Negative for appetite change, chills, fatigue, fever and unexpected weight change.   HENT:    Negative for hearing loss, mouth sores, sore throat, tinnitus, trouble swallowing and voice change.    Eyes:  Negative for eye problems.        GLASSES    Respiratory:  Negative for cough, shortness of breath and wheezing.    Cardiovascular:  Negative for chest pain, leg swelling and palpitations.   Gastrointestinal:  Positive for constipation, diarrhea, nausea and vomiting. Negative for abdominal distention and abdominal pain.        PT REPORTS FLUCTUATING GI SYMPTOMS    Genitourinary:  Negative for dysuria, frequency and hematuria.    Musculoskeletal:  Negative for arthralgias and myalgias.        JOINT SWELLING    Skin:  Negative for itching, rash and wound.   Neurological:  Positive for headaches. Negative for dizziness, extremity weakness, light-headedness and numbness.        PT REPORTS AN IMPROVEMENT IN MIGRAINES SINCE BEING ON VYEPTI. REPORTS 1-2 MIGRAINES PER WEEK THAT GO AWAY QUICKLY WITH MEDS IN PLACE. NO LONGER EXPERIENCES WEEK LONG MIGRAINES. DOES NOT APPROX 2 WEEKS BEFORE VYEPTI IS DUE SHE WILL HAVE IN AN INCREASE IN MIGRAINE S/S     Hematological:  Does not bruise/bleed easily.         Infusion Readiness:  - Assessment Concerns Related to Infusion: No  - Provider notified: n/a      New Patient Education:    N/A (returning patient for continuation of therapy. Ongoing education provided as needed.)        Treatment Conditions & Drug Specific Questions:    Eptinezumab  (VYEPTI)    (Unless otherwise specified on patient specific therapy plan):     DRUG SPECIFIC QUESTIONS:  Any recent cardiovascular or cerebrovascular ischemic events such as a stroke or heart attack?  No  (if yes hold and notify prescribing provider. Avoid use)     REMINDER:  Recommended Vitals/Observation:  Vitals: Monitor vitals at start and end of infusion, end of monitoring period (1st infusion), and as needed.  Observation: Patient may leave 15 minutes post-infusion for FIRST infusion. Patient may leave immediately upon completion for  all subsequent infusions        Weight Based Drug Calculations:    WEIGHT BASED DRUGS: NOT APPLICABLE / FLAT DOSE       Post Treatment: Patient tolerated treatment without issue and was discharged in no apparent distress.      Note Authored / Patient Cared for By: BUSTER Mooney-CNP

## 2025-07-31 NOTE — PATIENT INSTRUCTIONS
Today :We administered eptinezumab (Vyepti) 300 mg in sodium chloride 0.9% 103 mL IV.     For:   1. Migraine with status migrainosus, not intractable, unspecified migraine type         Your next appointment is due in:  3 MONTHS         Please read the  Medication Guide that was given to you and reviewed during todays visit.     (Tell all doctors including dentists that you are taking this medication)     Go to the emergency room or call 911 if:  -You have signs of allergic reaction:   -Rash, hives, itching.   -Swollen, blistered, peeling skin.   -Swelling of face, lips, mouth, tongue or throat.   -Tightness of chest, trouble breathing, swallowing or talking     Call your doctor:  - If IV / injection site gets red, warm, swollen, itchy or leaks fluid or pus.     (Leave dressing on your IV site for at least 2 hours and keep area clean and dry  - If you get sick or have symptoms of infection or are not feeling well for any reason.    (Wash your hands often, stay away from people who are sick)  - If you have side effects from your medication that do not go away or are bothersome.     (Refer to the teaching your nurse gave you for side effects to call your doctor about)    - Common side effects may include:  stuffy nose, headache, feeling tired, muscle aches, upset stomach  - Before receiving any vaccines     - Call the [facility] at   If:  - You get sick, are on antibiotics, have had a recent vaccine, have surgery or dental work and your doctor wants your visit rescheduled.  - You need to cancel and reschedule your visit for any reason. Call at least 2 days before your visit if you need to cancel.   - Your insurance changes before your next visit.    (We will need to get approval from your new insurance. This can take up to two weeks.)     The [facility] is opened Monday thru Friday. We are closed on weekends and holidays.   Voice mail will take your call if the center is closed. If you  leave a message please allow 24 hours for a call back during weekdays. If you leave a message on a weekend/holiday, we will call you back the next business day.    A pharmacist is available Monday - Friday from 8:30AM to 3:30PM to help answer any questions you may have about your prescriptions(s). Please call pharmacy at:    [facility]: [phone]  [facility]: [phone]  [facility]: [phone]

## 2025-10-31 ENCOUNTER — APPOINTMENT (OUTPATIENT)
Dept: INFUSION THERAPY | Facility: CLINIC | Age: 65
End: 2025-10-31
Payer: MEDICARE